# Patient Record
Sex: FEMALE | Race: WHITE | NOT HISPANIC OR LATINO | Employment: STUDENT | URBAN - METROPOLITAN AREA
[De-identification: names, ages, dates, MRNs, and addresses within clinical notes are randomized per-mention and may not be internally consistent; named-entity substitution may affect disease eponyms.]

---

## 2018-06-11 ENCOUNTER — APPOINTMENT (OUTPATIENT)
Dept: URGENT CARE | Facility: CLINIC | Age: 18
End: 2018-06-11

## 2018-06-11 PROCEDURE — 99283 EMERGENCY DEPT VISIT LOW MDM: CPT | Performed by: NURSE PRACTITIONER

## 2018-06-11 PROCEDURE — G0382 LEV 3 HOSP TYPE B ED VISIT: HCPCS | Performed by: NURSE PRACTITIONER

## 2018-06-14 ENCOUNTER — APPOINTMENT (OUTPATIENT)
Dept: URGENT CARE | Facility: CLINIC | Age: 18
End: 2018-06-14
Payer: OTHER MISCELLANEOUS

## 2018-06-14 PROCEDURE — 99213 OFFICE O/P EST LOW 20 MIN: CPT | Performed by: NURSE PRACTITIONER

## 2018-06-21 ENCOUNTER — APPOINTMENT (OUTPATIENT)
Dept: URGENT CARE | Facility: CLINIC | Age: 18
End: 2018-06-21
Payer: OTHER MISCELLANEOUS

## 2018-06-21 PROCEDURE — 99213 OFFICE O/P EST LOW 20 MIN: CPT

## 2019-07-26 ENCOUNTER — OFFICE VISIT (OUTPATIENT)
Dept: ENDOCRINOLOGY | Facility: HOSPITAL | Age: 19
End: 2019-07-26
Payer: COMMERCIAL

## 2019-07-26 VITALS
WEIGHT: 156.2 LBS | HEIGHT: 69 IN | SYSTOLIC BLOOD PRESSURE: 110 MMHG | HEART RATE: 84 BPM | DIASTOLIC BLOOD PRESSURE: 68 MMHG | BODY MASS INDEX: 23.13 KG/M2

## 2019-07-26 DIAGNOSIS — M54.2 ANTERIOR NECK PAIN: Primary | ICD-10-CM

## 2019-07-26 DIAGNOSIS — E04.9 GOITER: ICD-10-CM

## 2019-07-26 DIAGNOSIS — E04.1 THYROID NODULE: ICD-10-CM

## 2019-07-26 PROCEDURE — 99244 OFF/OP CNSLTJ NEW/EST MOD 40: CPT | Performed by: INTERNAL MEDICINE

## 2019-07-26 RX ORDER — NORGESTIMATE AND ETHINYL ESTRADIOL 7DAYSX3 28
1 KIT ORAL DAILY
Refills: 2 | COMMUNITY
Start: 2019-07-02 | End: 2021-06-03

## 2019-07-26 NOTE — PATIENT INSTRUCTIONS
We;ll try to get the thyroid blood work from the family doctors office  We'll have you sign for records from [facility]  We'll get a thyroid ultrasound  Follow up to be determined

## 2019-07-26 NOTE — PROGRESS NOTES
7/27/2019    Assessment/Plan      Diagnoses and all orders for this visit:    Anterior neck pain  -     US thyroid; Future    Thyroid nodule  -     US thyroid; Future    Goiter  -     US thyroid; Future    Other orders  -     TRI FEMYNOR 0 18/0 215/0 25 MG-35 MCG per tablet; Take 1 tablet by mouth daily        Assessment/Plan:  1  Thyroid nodule and goiter by history  I will get a repeat thyroid ultrasound now  I will try to get the previous study reports from [facility] and blood work from her PCP   Then decision can be made  If she has thyroid antibodies, thyroid hormone could be used to try to shrink the thyroid goiter and nodule to see if it helps her neck symptoms  2  Anterior neck pain  This is chronic and a sort of sensitivity type symptoms  I do not believe it is due to subacute thyroiditis as there would be much more intense pain  Follow up will be determined based on the information received  Addendum: I was able to get her blood work from her PCP which is normal with no antibodies  Last ultrasound done at [facility] in 2015 showed normal thyroid gland with small subcentimeter nodule  CC: Thyroid nodule and goiter consult    History of Present Illness     HPI: Leta Mccray is a 19y o  year old female with history of thyroid nodule and goiter  She has a lot of tenderness in left side of thyroid  She feels anything touching neck is very uncomfortable  She had MRI and CT scan in 10th grade and found abnormality in thyroid, possibly enlarged or a nodule  She feels when laying down on her back, she feels discomfort and as if her throat is closing off at times  Symptoms also occur with neck flexion  No swallowing issues  No XRT to head or neck in the past  Had an ultrasound of the thyroid done several years ago at [facility]  Of note, her mom has thyroid cancer and it was removed October 2018  She sees MICHELLE Gore  Her maternal aunt has hashimoto's  She is on no thyroid medications  She is always fatigued  She can have constipation or more frequent bowel movements especially at school  She can have trouble falling asleep at times  Resting heart rate is always over 85  She denies heat or cold intolerance, tremors, palpitations, diarrhea, anxiety, or depression  She denies hair loss, brittle nails, or dry skin  Weight is stable  Menses occur on a regular monthly basis on OCP  Prior to [facility], she had irregular menses  She has no diplopia  Review of Systems   Constitutional: Positive for fatigue  Negative for unexpected weight change  Always feels fatigued  Weight down 8-9 lbs since home from school, eating better and working out  HENT: Negative for hearing loss, tinnitus and trouble swallowing  No XRT to the head or neck in the past    Eyes: Negative for visual disturbance  No diplopia  Respiratory: Negative for chest tightness and shortness of breath  Cardiovascular: Negative for chest pain, palpitations and leg swelling  Says resting heart rate over 85 on watch  Gastrointestinal: Positive for constipation  Negative for abdominal pain, diarrhea and nausea  Can have constipation or more frequent bowel movements especially at school   Endocrine: Negative for cold intolerance and heat intolerance  Genitourinary:        Menses regular on a monthly basis on OCP  She originally went on OCP for irregular menses  Musculoskeletal: Positive for back pain  Negative for arthralgias  Chronic low back pain  Skin: Positive for rash  No dry skin  Occcasional patches of eczema  No brittle nails  No heair loss  Neurological: Negative for dizziness, tremors, light-headedness and headaches  Psychiatric/Behavioral: Positive for sleep disturbance  Negative for dysphoric mood  The patient is not nervous/anxious  Can have trouble falling asleep at times         Historical Information   Past Medical History:   Diagnosis Date    History of fractured vertebra 2014    L1 Past Surgical History:   Procedure Laterality Date    WISDOM TOOTH EXTRACTION       Social History   Social History     Substance and Sexual Activity   Alcohol Use Yes    Frequency: 2-4 times a month    Comment: at school occasionally on weekends     Social History     Substance and Sexual Activity   Drug Use Never     Social History     Tobacco Use   Smoking Status Never Smoker   Smokeless Tobacco Never Used     Family History:   Family History   Problem Relation Age of Onset    Thyroid cancer Mother     Hyperlipidemia Father     No Known Problems Brother     Hashimoto's thyroiditis Maternal Aunt     No Known Problems Brother        Meds/Allergies   Current Outpatient Medications   Medication Sig Dispense Refill    TRI Amelia Hamman 0 18/0 215/0 25 MG-35 MCG per tablet Take 1 tablet by mouth daily  2     No current facility-administered medications for this visit  Allergies   Allergen Reactions    Contrast [Iodinated Diagnostic Agents] Hives, Nausea Only and Vomiting       Objective   Vitals: Blood pressure 110/68, pulse 84, height 5' 9" (1 753 m), weight 70 9 kg (156 lb 3 2 oz)  Invasive Devices     None                 Physical Exam   Constitutional: She is oriented to person, place, and time  She appears well-developed and well-nourished  HENT:   Head: Normocephalic and atraumatic  Eyes: Pupils are equal, round, and reactive to light  Conjunctivae and EOM are normal    No lid lag, stare, proptosis, or periorbital edema  Neck: Normal range of motion  Neck supple  No thyromegaly present  Thyroid full without discrete nodule palpable  Tender and patient had strange feeling to palpation over the thyroid gland  No thyroid or carotid bruits  Cardiovascular: Normal rate, regular rhythm and normal heart sounds  No murmur heard  Pulmonary/Chest: Effort normal and breath sounds normal  She has no wheezes  Abdominal: Soft  Bowel sounds are normal  There is no tenderness     Musculoskeletal: Normal range of motion  She exhibits no edema or deformity  No tremor of the outstretched hands  No spinous process tenderness  No CVA tenderness  Lymphadenopathy:     She has no cervical adenopathy  Neurological: She is alert and oriented to person, place, and time  She has normal reflexes  Deep tendon reflexes normal without briskness  Skin: Skin is warm and dry  No rash noted  Vitals reviewed  The history was obtained from the review of the chart and from the patient  Lab Results:    Blood work done at [facility] on 5/6/19 showed a normal CMP, CBC, and lipid panel  TSH is 2 45  Thyroid peroxidase antibodies are 10 and thyroglobulin antibodies are <1 0  Thyroid ultrasound one at [facility] in 2015 showed aright thyroid lobe of 4 2 x 1 3 x 1 1 and a left lobe of the thyroid of 3 7 x 1 3 x 0 9  There was a left 2 mm hypoechoic nodule representing a colloid cyst       No results found for this or any previous visit (from the past [id number] hour(s))  No future appointments

## 2019-07-30 ENCOUNTER — TELEPHONE (OUTPATIENT)
Dept: ENDOCRINOLOGY | Facility: CLINIC | Age: 19
End: 2019-07-30

## 2019-07-31 ENCOUNTER — HOSPITAL ENCOUNTER (OUTPATIENT)
Dept: ULTRASOUND IMAGING | Facility: HOSPITAL | Age: 19
Discharge: HOME | End: 2019-07-31
Payer: COMMERCIAL

## 2019-07-31 DIAGNOSIS — E04.1 THYROID NODULE: ICD-10-CM

## 2019-07-31 DIAGNOSIS — E04.9 GOITER: ICD-10-CM

## 2019-07-31 DIAGNOSIS — M54.2 ANTERIOR NECK PAIN: ICD-10-CM

## 2019-07-31 PROCEDURE — 76536 US EXAM OF HEAD AND NECK: CPT

## 2021-06-02 RX ORDER — PHENOL 1.4 %
1 AEROSOL, SPRAY (ML) MUCOUS MEMBRANE DAILY
COMMUNITY

## 2021-06-02 RX ORDER — ECHINACEA PURPUREA EXTRACT 125 MG
1 TABLET ORAL DAILY
COMMUNITY
End: 2022-03-08

## 2021-06-02 RX ORDER — DEXTROAMPHETAMINE SACCHARATE, AMPHETAMINE ASPARTATE, DEXTROAMPHETAMINE SULFATE AND AMPHETAMINE SULFATE 3.125; 3.125; 3.125; 3.125 MG/1; MG/1; MG/1; MG/1
1 TABLET ORAL DAILY
COMMUNITY
End: 2022-03-08

## 2021-06-03 ENCOUNTER — OFFICE VISIT (OUTPATIENT)
Dept: OBGYN CLINIC | Facility: CLINIC | Age: 21
End: 2021-06-03
Payer: COMMERCIAL

## 2021-06-03 VITALS
SYSTOLIC BLOOD PRESSURE: 90 MMHG | DIASTOLIC BLOOD PRESSURE: 70 MMHG | WEIGHT: 158 LBS | HEIGHT: 69 IN | BODY MASS INDEX: 23.4 KG/M2

## 2021-06-03 DIAGNOSIS — Z11.3 SCREENING EXAMINATION FOR STD (SEXUALLY TRANSMITTED DISEASE): Primary | ICD-10-CM

## 2021-06-03 PROCEDURE — 87591 N.GONORRHOEAE DNA AMP PROB: CPT | Performed by: OBSTETRICS & GYNECOLOGY

## 2021-06-03 PROCEDURE — 99213 OFFICE O/P EST LOW 20 MIN: CPT | Performed by: OBSTETRICS & GYNECOLOGY

## 2021-06-03 PROCEDURE — 87491 CHLMYD TRACH DNA AMP PROBE: CPT | Performed by: OBSTETRICS & GYNECOLOGY

## 2021-06-03 RX ORDER — NORETHINDRONE ACETATE AND ETHINYL ESTRADIOL 1MG-20(24)
1 KIT ORAL DAILY
COMMUNITY
End: 2022-01-07 | Stop reason: SDUPTHER

## 2021-06-03 NOTE — PATIENT INSTRUCTIONS
Encouraged  Condom use     If you have further  Questioning  Access the  [facility] website

## 2021-06-03 NOTE — PROGRESS NOTES
PROBLEM GYNECOLOGICAL VISIT    Joe Carmen is a 23 y o  female who presents today with complaint of desire for  sti screening  Her general medical history has been reviewed and she reports it as follows:    Past Medical History:   Diagnosis Date    Chlamydia 2016    History of fractured vertebra 2014    L1    Papanicolaou smear 11/25/2020    neg    Thyroid goiter      Past Surgical History:   Procedure Laterality Date    WISDOM TOOTH EXTRACTION       OB History    No obstetric history on file  Social History     Tobacco Use    Smoking status: Never Smoker    Smokeless tobacco: Never Used   Substance Use Topics    Alcohol use: Yes     Frequency: 2-4 times a month     Comment: at school occasionally on weekends    Drug use: Never       Current Outpatient Medications   Medication Instructions    amphetamine-dextroamphetamine (ADDERALL) 12 5 MG tablet 1 tablet, Oral, Daily    Echinacea 125 MG TABS 1 tablet, Oral, Daily    Multiple Vitamins-Minerals (Multivitamin Women) TABS 1 tablet, Oral, Daily    norethindrone-ethinyl estradiol-ferrous fumarate (Blisovi 24 Fe) 1-20 MG-MCG(24) per tablet 1 tablet, Oral, Daily    TRI FEMYNOR 0 18/0 215/0 25 MG-35 MCG per tablet 1 tablet, Oral, Daily       History of Present Illness:   Pt desires  STD screening      Review of Systems:  Review of Systems   Constitutional: Negative  Gastrointestinal: Negative  Endocrine: Negative  Genitourinary: Negative  Neurological: Negative  Psychiatric/Behavioral: Negative  All other systems reviewed and are negative  Physical Exam:  There were no vitals taken for this visit  Physical Exam  Constitutional:       Appearance: Normal appearance  Genitourinary:      Vulva and rectum normal    Neck:      Musculoskeletal: Normal range of motion  Neurological:      General: No focal deficit present  Mental Status: She is alert     Psychiatric:         Mood and Affect: Mood normal          Behavior: Behavior normal    Vitals signs and nursing note reviewed  Assessment:   1  Screening for  Std      Plan:  Encouraged  Condom use   Dwpt  That  They are screening tests and  We may need to do  Further counseling and testing  Reviewed with patient that [facility], but that they will not necessarily be reviewed by me immediately  Explained that I will review results at my earliest opportunity and contact patient appropriately

## 2021-06-04 LAB
C TRACH DNA SPEC QL NAA+PROBE: NEGATIVE
HAV IGM SERPL QL IA: NEGATIVE
HBV CORE IGM SERPL QL IA: NEGATIVE
HBV SURFACE AG SERPL QL IA: NEGATIVE
HCV AB S/CO SERPL IA: <0.1 S/CO RATIO (ref 0–0.9)
HIV 1+2 AB+HIV1 P24 AG SERPL QL IA: NON REACTIVE
N GONORRHOEA DNA SPEC QL NAA+PROBE: NEGATIVE
RPR SER QL: NON REACTIVE

## 2021-09-23 ENCOUNTER — VBI (OUTPATIENT)
Dept: ADMINISTRATIVE | Facility: OTHER | Age: 21
End: 2021-09-23

## 2021-09-23 NOTE — TELEPHONE ENCOUNTER
Upon review of the In Basket request we were able to locate, review, and update the patient chart as requested for Pap Smear (HPV) aka Cervical Cancer Screening  Any additional questions or concerns should be emailed to the Practice Liaisons via [email or web address] email, please do not reply via In Basket      Thank you  Liliana Wetzel

## 2022-01-07 ENCOUNTER — TELEPHONE (OUTPATIENT)
Dept: OBGYN CLINIC | Facility: CLINIC | Age: 22
End: 2022-01-07

## 2022-01-07 DIAGNOSIS — Z30.41 SURVEILLANCE FOR BIRTH CONTROL, ORAL CONTRACEPTIVES: Primary | ICD-10-CM

## 2022-01-07 RX ORDER — NORETHINDRONE ACETATE AND ETHINYL ESTRADIOL 1MG-20(24)
1 KIT ORAL DAILY
Qty: 90 TABLET | Refills: 0 | Status: SHIPPED | OUTPATIENT
Start: 2022-01-07 | End: 2022-01-10

## 2022-01-07 NOTE — TELEPHONE ENCOUNTER
Mabel left a message requesting Blisov 24 Fe renewal  She did schedule WA for 03/08/22  She also has an appt on Monday for a problem  Sent message to BinWiselamar Ward

## 2022-01-10 ENCOUNTER — OFFICE VISIT (OUTPATIENT)
Dept: OBGYN CLINIC | Facility: CLINIC | Age: 22
End: 2022-01-10
Payer: COMMERCIAL

## 2022-01-10 VITALS
WEIGHT: 162 LBS | SYSTOLIC BLOOD PRESSURE: 104 MMHG | DIASTOLIC BLOOD PRESSURE: 74 MMHG | BODY MASS INDEX: 23.99 KG/M2 | HEIGHT: 69 IN

## 2022-01-10 DIAGNOSIS — Z30.41 ORAL CONTRACEPTIVE PILL SURVEILLANCE: ICD-10-CM

## 2022-01-10 DIAGNOSIS — N76.1 SUBACUTE VAGINITIS: ICD-10-CM

## 2022-01-10 DIAGNOSIS — Z11.3 SCREEN FOR STD (SEXUALLY TRANSMITTED DISEASE): Primary | ICD-10-CM

## 2022-01-10 DIAGNOSIS — N92.6 IRREGULAR MENSES: ICD-10-CM

## 2022-01-10 LAB
BV WHIFF TEST VAG QL: ABNORMAL
CLUE CELLS SPEC QL WET PREP: ABNORMAL
PH SMN: 4 [PH]
SL AMB POCT WET MOUNT: ABNORMAL
T VAGINALIS VAG QL WET PREP: ABNORMAL
YEAST VAG QL WET PREP: ABNORMAL

## 2022-01-10 PROCEDURE — 87210 SMEAR WET MOUNT SALINE/INK: CPT | Performed by: OBSTETRICS & GYNECOLOGY

## 2022-01-10 PROCEDURE — 99213 OFFICE O/P EST LOW 20 MIN: CPT | Performed by: OBSTETRICS & GYNECOLOGY

## 2022-01-10 RX ORDER — NORETHINDRONE ACETATE AND ETHINYL ESTRADIOL 1MG-20(24)
1 KIT ORAL DAILY
Qty: 90 TABLET | Refills: 0 | Status: SHIPPED | OUTPATIENT
Start: 2022-01-10 | End: 2022-03-08 | Stop reason: SDUPTHER

## 2022-01-10 RX ORDER — DEXTROAMPHETAMINE SACCHARATE, AMPHETAMINE ASPARTATE, DEXTROAMPHETAMINE SULFATE AND AMPHETAMINE SULFATE 3.75; 3.75; 3.75; 3.75 MG/1; MG/1; MG/1; MG/1
15 TABLET ORAL DAILY
COMMUNITY

## 2022-01-10 NOTE — PROGRESS NOTES
PROBLEM GYNECOLOGICAL VISIT    Johnny Camarena is a 19 y o  female who presents today with complaint of vaginal discharge  Her general medical history has been reviewed and she reports it as follows:    Past Medical History:   Diagnosis Date    Chlamydia 2016    History of fractured vertebra     L1    Papanicolaou smear 2020    neg    Thyroid goiter      Past Surgical History:   Procedure Laterality Date    WISDOM TOOTH EXTRACTION       OB History        0    Para   0    Term   0       0    AB   0    Living   0       SAB   0    IAB   0    Ectopic   0    Multiple   0    Live Births   0               Social History     Tobacco Use    Smoking status: Never Smoker    Smokeless tobacco: Never Used   Vaping Use    Vaping Use: Never used   Substance Use Topics    Alcohol use: Yes     Comment: at school occasionally on weekends    Drug use: Never       Current Outpatient Medications   Medication Instructions    amphetamine-dextroamphetamine (ADDERALL) 12 5 MG tablet 1 tablet, Oral, Daily    amphetamine-dextroamphetamine (ADDERALL) 15 MG tablet 15 mg, Oral, Daily    Echinacea 125 MG TABS 1 tablet, Oral, Daily    Multiple Vitamins-Minerals (Multivitamin Women) TABS 1 tablet, Oral, Daily    norethindrone-ethinyl estradiol-ferrous fumarate (Blisovi 24 Fe) 1-20 MG-MCG(24) per tablet 1 tablet, Oral, Daily       History of Present Illness:   +  Vaginal discharge and  Odor  Menses have  Been lighter   w only one day bleeding     + missed pills   Can be late taking them    Pregancy test  On       +  Ex boyfriend some condom use  Review of Systems:  Review of Systems   Constitutional: Negative  Eyes: Negative  Respiratory: Negative  Cardiovascular: Negative  Gastrointestinal: Negative  Endocrine: Negative  Genitourinary: Positive for decreased urine volume, menstrual problem and vaginal discharge   Negative for flank pain, frequency, genital sores, hematuria and vaginal bleeding  Neurological: Negative  Hematological: Negative  Psychiatric/Behavioral: Negative  Physical Exam:  /74 (BP Location: Left arm, Patient Position: Sitting, Cuff Size: Standard)   Ht 5' 9" (1 753 m)   Wt 73 5 kg (162 lb)   LMP 12/11/2021 Comment: for one day only   BMI 23 92 kg/m²   Physical Exam  Constitutional:       Appearance: Normal appearance  HENT:      Head: Normocephalic  Abdominal:      General: Abdomen is flat  Palpations: Abdomen is soft  Musculoskeletal:         General: Normal range of motion  Neurological:      General: No focal deficit present  Mental Status: She is alert and oriented to person, place, and time  Skin:     General: Skin is warm and dry  Psychiatric:         Mood and Affect: Mood normal          Behavior: Behavior normal          Thought Content: Thought content normal          Judgment: Judgment normal    Vitals and nursing note reviewed  Point of Care Testing:   -Wet mount: +    -KOH mount: neg   -Whiff: neg       Assessment:   1  Candida vaginitis ,  Vulvitis    Screen for std  Irregular menses  Keep a menstrual calender      Plan:   *GC chlam  Ordered  And  Sent , open  To air ,   Terazol 7 one applicator at bedtime for  7 nights  Decrease sugar intake  Condoms  Reviewed with patient that test results are available in MyChart immediately, but that they will not necessarily be reviewed by me immediately    Explained that I will review results at my earliest opportunity and contact patient appropriately

## 2022-01-10 NOTE — PATIENT INSTRUCTIONS
- Through a patient centered approach to contraceptive counseling, we discussed a variety of contraceptive methods including pill, patch, ring, injectable, long-acting reversible methods such as the subdermal contraceptive implant and intrauterine device, and sterilization  We compared the efficacy of various methods using a tiered efficacy chart  We discussed the expected changes on menstrual bleeding and other side effects of various methods  - We assessed for medical conditions that could affect the safety profile of contraception  She does not smoke, denies a history of hypertension or VTE, and denies a history of migraine with aura  - The patient opts to proceed with continuing the pill  If she  Desires  An  IUD she will contact the office  Make sure you use the  Terazol 7 cream for at least  5 nights per vagina

## 2022-01-13 LAB
C TRACH RRNA SPEC QL NAA+PROBE: NEGATIVE
N GONORRHOEA RRNA SPEC QL NAA+PROBE: NEGATIVE

## 2022-03-08 ENCOUNTER — ANNUAL EXAM (OUTPATIENT)
Dept: OBGYN CLINIC | Facility: CLINIC | Age: 22
End: 2022-03-08
Payer: COMMERCIAL

## 2022-03-08 VITALS
DIASTOLIC BLOOD PRESSURE: 72 MMHG | SYSTOLIC BLOOD PRESSURE: 110 MMHG | WEIGHT: 164.4 LBS | BODY MASS INDEX: 24.35 KG/M2 | HEIGHT: 69 IN

## 2022-03-08 DIAGNOSIS — Z11.3 SCREEN FOR STD (SEXUALLY TRANSMITTED DISEASE): ICD-10-CM

## 2022-03-08 DIAGNOSIS — N76.1 SUBACUTE VAGINITIS: ICD-10-CM

## 2022-03-08 DIAGNOSIS — Z30.41 ORAL CONTRACEPTIVE PILL SURVEILLANCE: ICD-10-CM

## 2022-03-08 DIAGNOSIS — Z01.419 ENCOUNTER FOR GYNECOLOGICAL EXAMINATION WITHOUT ABNORMAL FINDING: Primary | ICD-10-CM

## 2022-03-08 LAB
BV WHIFF TEST VAG QL: NORMAL
CLUE CELLS SPEC QL WET PREP: NORMAL
PH SMN: 4 [PH]
SL AMB POCT WET MOUNT: NORMAL
T VAGINALIS VAG QL WET PREP: NORMAL
YEAST VAG QL WET PREP: NORMAL

## 2022-03-08 PROCEDURE — S0612 ANNUAL GYNECOLOGICAL EXAMINA: HCPCS | Performed by: OBSTETRICS & GYNECOLOGY

## 2022-03-08 PROCEDURE — 87210 SMEAR WET MOUNT SALINE/INK: CPT | Performed by: OBSTETRICS & GYNECOLOGY

## 2022-03-08 RX ORDER — CETIRIZINE HYDROCHLORIDE 10 MG/1
10 TABLET ORAL DAILY
COMMUNITY
Start: 2022-01-25

## 2022-03-08 RX ORDER — NORETHINDRONE ACETATE AND ETHINYL ESTRADIOL 1MG-20(24)
1 KIT ORAL DAILY
Qty: 90 TABLET | Refills: 4 | Status: SHIPPED | OUTPATIENT
Start: 2022-03-08 | End: 2023-03-08

## 2022-03-08 RX ORDER — DOXYCYCLINE HYCLATE 100 MG
TABLET ORAL
COMMUNITY
Start: 2022-03-03 | End: 2022-03-08

## 2022-03-08 NOTE — PROGRESS NOTES
[address], [address], [facility], [address] [facility]    ASSESSMENT/PLAN: Arron Brush is a 19 y o  Amada Mealing who presents for annual gynecologic exam     Encounter for routine gynecologic examination  - Routine well woman exam completed today  - HPV Vaccination status: Immunization series complete   -COVID vccine  +  Pfizer and booster   ,   Flu shot   +   - STI screening offered including HIV testing: today cultures    - Contraceptive counseling discussed  Current contraception: condoms or combination OCPs:     Additional problems addressed during this visit:  1  Encounter for gynecological examination without abnormal finding    2  Screen for STD (sexually transmitted disease)  Comments:  condoms     3  Oral contraceptive pill surveillance  Comments:  pt w  light spotting one day ,  Denies  ACHEs adn BTB   if no spottingin 3 mo contact office COLTON babcock   Orders:  -     norethindrone-ethinyl estradiol-ferrous fumarate (Blisovi 24 Fe) 1-20 MG-MCG(24) per tablet; Take 1 tablet by mouth daily    4  Oral contraceptive pill surveillance  Comments:  + missed pills and  menses have been very light   Orders:  -     norethindrone-ethinyl estradiol-ferrous fumarate (Blisovi 24 Fe) 1-20 MG-MCG(24) per tablet; Take 1 tablet by mouth daily    5  Subacute vaginitis  Comments:  wet mount neg, may use refill open to air   Orders:  -     POCT wet mount        CC:  Annual Gynecologic Examination    HPI: Arron Brush is a 19 y o  Amada Mealing who presents for annual gynecologic examination  17-year-old  0 para 0 here for wellness exam   Past medical history acne thyroid goiter past surgical history wisdom teeth  Family history maternal grandmother diagnosed with breast cancer no family history of uterine ovarian or colon cancer  Social history nonsmoker occasional alcohol on weekends maybe 1-4 per week    Gyn history on OCP cycles every 28-30 days regular mild to moderate cramps previously sexually active encouraged condom use treated for chlamydia in 2016  Positive history of female ejaculation w  [place]  The following portions of the patient's history were reviewed and updated as appropriate: She  has a past medical history of Chlamydia (2016), History of fractured vertebra (2014), Melanoma ([facility] [address] ), Papanicolaou smear (11/25/2020), and Thyroid goiter  She  has a past surgical history that includes [place] tooth extraction and Skin cancer excision (Right)  Her family history includes Breast cancer in her maternal grandmother; Hashimoto's thyroiditis in her maternal aunt; Hyperlipidemia in her father; No Known Problems in her brother and brother; Thyroid cancer in her mother  She  reports that she has never smoked  She has never used smokeless tobacco  She reports current alcohol use  She reports that she does not use drugs  Current Outpatient Medications   Medication Sig Dispense Refill    amphetamine-dextroamphetamine (ADDERALL) 15 MG tablet Take 15 mg by mouth daily      cetirizine (ZyrTEC) 10 mg tablet Take 10 mg by mouth daily      Multiple Vitamins-Minerals (Multivitamin Women) TABS Take 1 tablet by mouth daily      norethindrone-ethinyl estradiol-ferrous fumarate (Blisovi 24 Fe) 1-20 MG-MCG(24) per tablet Take 1 tablet by mouth daily 90 tablet 4     No current facility-administered medications for this visit  She is allergic to contrast [iodinated diagnostic agents]       Review of Systems   Constitutional: Negative for chills and fever  HENT: Negative for ear pain and sore throat  Eyes: Negative for pain and visual disturbance  Respiratory: Negative for cough and shortness of breath  Cardiovascular: Negative for chest pain and palpitations  Gastrointestinal: Negative for abdominal pain and vomiting  Genitourinary: Negative for dysuria and hematuria  Musculoskeletal: Negative for arthralgias and back pain  Skin: Negative for color change and rash  Neurological: Negative for seizures and syncope  Psychiatric/Behavioral: Negative  All other systems reviewed and are negative  Objective:  /72 (BP Location: Left arm, Patient Position: Sitting, Cuff Size: Standard)   Ht 5' 8 5" (1 74 m)   Wt 74 6 kg (164 lb 6 4 oz)   LMP 12/11/2021 (Exact Date)   BMI 24 63 kg/m²    Physical Exam  Vitals and nursing note reviewed  Constitutional:       Appearance: Normal appearance  HENT:      Head: Normocephalic  Cardiovascular:      Rate and Rhythm: Normal rate and regular rhythm  Pulses: Normal pulses  Heart sounds: Normal heart sounds  Pulmonary:      Effort: Pulmonary effort is normal       Breath sounds: Normal breath sounds  Chest:      Chest wall: No mass, lacerations, swelling, tenderness or edema  Breasts: Jaret Score is 4  Breasts are symmetrical       Right: Normal  No swelling, bleeding, inverted nipple, mass, nipple discharge, skin change, tenderness, axillary adenopathy or supraclavicular adenopathy  Left: No swelling, bleeding, inverted nipple, mass, nipple discharge, skin change, tenderness, axillary adenopathy or supraclavicular adenopathy  Abdominal:      General: Abdomen is flat  Bowel sounds are normal       Palpations: Abdomen is soft  Genitourinary:     General: Normal vulva  Exam position: Lithotomy position  Pubic Area: No rash  Jaret stage (genital): 4       Labia:         Right: No rash, tenderness or lesion  Left: No rash, tenderness or lesion  Urethra: No urethral pain, urethral swelling or urethral lesion  Vagina: Normal       Cervix: No cervical motion tenderness or discharge  Uterus: Normal        Adnexa: Right adnexa normal and left adnexa normal       Rectum: Normal    Musculoskeletal:         General: Normal range of motion  Cervical back: Neck supple     Lymphadenopathy:      Upper Body:      Right upper body: No supraclavicular, axillary or pectoral adenopathy  Left upper body: No supraclavicular, axillary or pectoral adenopathy  Lower Body: No right inguinal adenopathy  No left inguinal adenopathy  Skin:     General: Skin is warm and dry  Neurological:      General: No focal deficit present  Mental Status: She is alert and oriented to person, place, and time  Psychiatric:         Mood and Affect: Mood normal          Behavior: Behavior normal          Thought Content:  Thought content normal          Judgment: Judgment normal        wet mount  Ph  4 neg  Whiff  no clue or  Hyphae

## 2022-03-08 NOTE — PATIENT INSTRUCTIONS
Pap every 3 years if normal, STI testing as indicated, exercise most days of week, obtain appropriate diet and hydration, Calcium 1000mg + 600 vit D daily, birth control as directed (ACHES reviewed)  Benefits, risks and alternatives discussed/reviewed  Condom use when sexually active for sexually transmitted infection prevention  HPV 9 vaccine recommended through age 39  Check with your insurance for coverage  If covered, call office to schedule start of vaccine series  Annual mammogram starting at age 36, monthly breast self exam  [place] Cancer   at red light or at least  20 times a day

## 2022-03-10 LAB
C TRACH RRNA SPEC QL NAA+PROBE: NEGATIVE
N GONORRHOEA RRNA SPEC QL NAA+PROBE: NEGATIVE

## 2022-07-05 ENCOUNTER — TELEPHONE (OUTPATIENT)
Dept: OBGYN CLINIC | Facility: CLINIC | Age: 22
End: 2022-07-05
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
## 2022-07-05 NOTE — TELEPHONE ENCOUNTER
Mabel l/m she has a question for Alan Liu 77  She did not want to send her question through the patient portal  L/M on patient v/m returning her call  C/B for further discussion

## 2022-10-12 PROBLEM — Z11.3 SCREEN FOR STD (SEXUALLY TRANSMITTED DISEASE): Status: RESOLVED | Noted: 2022-01-10 | Resolved: 2022-10-12

## 2022-10-21 ENCOUNTER — OFFICE VISIT (OUTPATIENT)
Dept: OBGYN CLINIC | Facility: CLINIC | Age: 22
End: 2022-10-21

## 2022-10-21 VITALS
WEIGHT: 165.4 LBS | HEIGHT: 69 IN | DIASTOLIC BLOOD PRESSURE: 75 MMHG | SYSTOLIC BLOOD PRESSURE: 130 MMHG | BODY MASS INDEX: 24.5 KG/M2

## 2022-10-21 DIAGNOSIS — B37.9 YEAST INFECTION: ICD-10-CM

## 2022-10-21 DIAGNOSIS — B96.89 BACTERIAL VAGINOSIS: ICD-10-CM

## 2022-10-21 DIAGNOSIS — N76.0 BACTERIAL VAGINOSIS: ICD-10-CM

## 2022-10-21 DIAGNOSIS — Z11.3 SCREENING EXAMINATION FOR VENEREAL DISEASE: Primary | ICD-10-CM

## 2022-10-21 PROBLEM — J01.90 ACUTE SINUSITIS: Status: ACTIVE | Noted: 2021-02-17

## 2022-10-21 PROBLEM — Z20.828 SARS-ASSOCIATED CORONAVIRUS EXPOSURE: Status: ACTIVE | Noted: 2021-02-17

## 2022-10-21 LAB
BV WHIFF TEST VAG QL: ABNORMAL
CLUE CELLS SPEC QL WET PREP: ABNORMAL
PH SMN: 3.5 [PH]
SL AMB POCT WET MOUNT: ABNORMAL
T VAGINALIS VAG QL WET PREP: ABNORMAL
YEAST VAG QL WET PREP: ABNORMAL

## 2022-10-21 RX ORDER — FLUCONAZOLE 150 MG/1
150 TABLET ORAL EVERY OTHER DAY
Qty: 2 TABLET | Refills: 0 | Status: SHIPPED | OUTPATIENT
Start: 2022-10-21 | End: 2022-10-24

## 2022-10-21 RX ORDER — METRONIDAZOLE 7.5 MG/G
1 GEL VAGINAL DAILY
Qty: 70 G | Refills: 0 | Status: SHIPPED | OUTPATIENT
Start: 2022-10-21 | End: 2022-10-26

## 2022-10-21 NOTE — PROGRESS NOTES
Assessment/Plan:    Bacterial vaginosis  Reviewed BV and yeast on wet mount today  Will plan to treat with Diflucan 150mg x 2 doses and Metrogel vaginal x 5 nights  For prevention: Recommend acidophilus or yogurt daily, avoid irritating soaps or lotions, no tight fitted pants or underwear, avoid bubble baths, do not stay in wet swimsuit  STD cultures done today  Call office if symptoms are not improving or worsening  Reassured patient normal to not get much of a menses on Blisovi 24 Fe  Problem List Items Addressed This Visit        Genitourinary    Bacterial vaginosis     Reviewed BV and yeast on wet mount today  Will plan to treat with Diflucan 150mg x 2 doses and Metrogel vaginal x 5 nights  For prevention: Recommend acidophilus or yogurt daily, avoid irritating soaps or lotions, no tight fitted pants or underwear, avoid bubble baths, do not stay in wet swimsuit  STD cultures done today  Call office if symptoms are not improving or worsening  Reassured patient normal to not get much of a menses on Blisovi 24 Fe  Relevant Medications    metroNIDAZOLE (METROGEL) 0 75 % vaginal gel    Other Relevant Orders    POCT wet mount (Completed)      Other Visit Diagnoses     Screening examination for venereal disease    -  Primary    Relevant Orders    Chlamydia/GC amplified DNA by PCR    Yeast infection        Relevant Medications    fluconazole (DIFLUCAN) 150 mg tablet    Other Relevant Orders    POCT wet mount (Completed)            Subjective:      Patient ID: Magy Rebolledo is a 23 y o  female  HPI  22 yo seen for persistent vaginal discharge and irritation  Tried Monistat about 2 months ago with no relief  Denies odor, pelvic pain, fever, chills  Would like STD testing today  Currently on Blisovi 24  Reports gets menses once every 3 months and when she does get it, just brown discharge     The following portions of the patient's history were reviewed and updated as appropriate: She  has a past medical history of Chlamydia (2016), History of fractured vertebra (2014), Melanoma ([facility] [address] ), Papanicolaou smear (11/25/2020), and Thyroid goiter  She   Patient Active Problem List    Diagnosis Date Noted   • Bacterial vaginosis 10/21/2022   • Oral contraceptive pill surveillance 03/08/2022   • Subacute vaginitis 01/10/2022   • Irregular menses 01/10/2022   • Acute sinusitis 02/17/2021   • SARS-associated coronavirus exposure 02/17/2021   • Anterior neck pain 07/26/2019   • Goiter 07/26/2019   • Thyroid nodule 01/08/2014   • L1 vertebral fracture ([facility] [address] ) 01/02/2014     She  has a past surgical history that includes [place] tooth extraction and Skin cancer excision (Right)  Her family history includes Breast cancer in her maternal grandmother; Hashimoto's thyroiditis in her maternal aunt; Hyperlipidemia in her father; No Known Problems in her brother and brother; Thyroid cancer in her mother  She  reports that she has never smoked  She has never used smokeless tobacco  She reports current alcohol use  She reports that she does not use drugs  Current Outpatient Medications   Medication Sig Dispense Refill   • amphetamine-dextroamphetamine (ADDERALL) 15 MG tablet Take 15 mg by mouth daily     • fluconazole (DIFLUCAN) 150 mg tablet Take 1 tablet (150 mg total) by mouth every other day for 2 doses 2 tablet 0   • metroNIDAZOLE (METROGEL) 0 75 % vaginal gel Insert 1 application into the vagina in the morning for 5 days 70 g 0   • Multiple Vitamins-Minerals (Multivitamin Women) TABS Take 1 tablet by mouth daily     • norethindrone-ethinyl estradiol-ferrous fumarate (Blisovi 24 Fe) 1-20 MG-MCG(24) per tablet Take 1 tablet by mouth daily 90 tablet 4     No current facility-administered medications for this visit  She is allergic to iodinated diagnostic agents       Review of Systems   Constitutional: Negative for fatigue, fever and unexpected weight change     HENT: Negative for dental problem and sinus pressure  Eyes: Negative for visual disturbance  Respiratory: Negative for cough, shortness of breath and wheezing  Cardiovascular: Negative for chest pain  Gastrointestinal: Negative for abdominal pain, blood in stool, constipation, diarrhea, nausea and vomiting  Endocrine: Negative for polydipsia  Genitourinary: Positive for vaginal discharge  Negative for difficulty urinating, dyspareunia, dysuria, frequency, hematuria, pelvic pain and urgency  Musculoskeletal: Negative for arthralgias and back pain  Neurological: Negative for dizziness, seizures, light-headedness and headaches  Psychiatric/Behavioral: Negative for suicidal ideas  The patient is not nervous/anxious  Objective:      /75 (BP Location: Left arm, Patient Position: Sitting, Cuff Size: Standard)   Ht 5' 8 5" (1 74 m)   Wt 75 kg (165 lb 6 4 oz)   BMI 24 78 kg/m²          Physical Exam  Constitutional:       Appearance: Normal appearance  She is well-developed  Neck:      Thyroid: No thyroid mass or thyromegaly  Cardiovascular:      Rate and Rhythm: Normal rate and regular rhythm  Heart sounds: Normal heart sounds  No murmur heard  No friction rub  No gallop  Pulmonary:      Effort: Pulmonary effort is normal  No respiratory distress  Breath sounds: Normal breath sounds  No wheezing or rales  Chest:   Breasts:      Right: No supraclavicular adenopathy  Left: No supraclavicular adenopathy  Abdominal:      General: Bowel sounds are normal  There is no distension  Palpations: Abdomen is soft  There is no mass  Tenderness: There is no abdominal tenderness  There is no guarding or rebound  Genitourinary:     Labia:         Right: No rash, tenderness, lesion or injury  Left: No rash, tenderness, lesion or injury  Urethra: No prolapse, urethral pain, urethral swelling or urethral lesion  Vagina: No signs of injury   Vaginal discharge (white discharge) present  No erythema, tenderness or bleeding  Cervix: No cervical motion tenderness, discharge or friability  Uterus: Not deviated, not enlarged and not tender  Adnexa:         Right: No mass, tenderness or fullness  Left: No mass, tenderness or fullness  Musculoskeletal:      Cervical back: Neck supple  Lymphadenopathy:      Cervical: No cervical adenopathy  Upper Body:      Right upper body: No supraclavicular adenopathy  Left upper body: No supraclavicular adenopathy  Skin:     General: Skin is warm and dry  Coloration: Skin is not pale  Findings: No erythema or rash  Neurological:      Mental Status: She is alert and oriented to person, place, and time  Psychiatric:         Behavior: Behavior normal          Thought Content: Thought content normal          Judgment: Judgment normal        wet mount: clue cells and budding yeast throughout  No trichomonads   PH: 3 5

## 2022-10-21 NOTE — ASSESSMENT & PLAN NOTE
Reviewed BV and yeast on wet mount today  Will plan to treat with Diflucan 150mg x 2 doses and Metrogel vaginal x 5 nights  For prevention: Recommend acidophilus or yogurt daily, avoid irritating soaps or lotions, no tight fitted pants or underwear, avoid bubble baths, do not stay in wet swimsuit  STD cultures done today  Call office if symptoms are not improving or worsening  Reassured patient normal to not get much of a menses on Blisovi 24 Fe

## 2022-10-23 LAB
C TRACH RRNA SPEC QL NAA+PROBE: NEGATIVE
N GONORRHOEA RRNA SPEC QL NAA+PROBE: NEGATIVE

## 2022-12-01 DIAGNOSIS — B96.89 BACTERIAL VAGINOSIS: Primary | ICD-10-CM

## 2022-12-01 DIAGNOSIS — N76.0 BACTERIAL VAGINOSIS: Primary | ICD-10-CM

## 2022-12-01 RX ORDER — METRONIDAZOLE 500 MG/1
500 TABLET ORAL EVERY 12 HOURS SCHEDULED
Qty: 14 TABLET | Refills: 0 | Status: SHIPPED | OUTPATIENT
Start: 2022-12-01 | End: 2022-12-08

## 2022-12-20 DIAGNOSIS — Z30.41 ORAL CONTRACEPTIVE PILL SURVEILLANCE: ICD-10-CM

## 2022-12-20 PROBLEM — J01.90 ACUTE SINUSITIS: Status: RESOLVED | Noted: 2021-02-17 | Resolved: 2022-12-20

## 2022-12-20 RX ORDER — NORETHINDRONE ACETATE AND ETHINYL ESTRADIOL 1MG-20(24)
1 KIT ORAL DAILY
Qty: 90 TABLET | Refills: 0 | Status: SHIPPED | OUTPATIENT
Start: 2022-12-20 | End: 2023-03-21

## 2023-03-21 DIAGNOSIS — Z30.41 ORAL CONTRACEPTIVE PILL SURVEILLANCE: ICD-10-CM

## 2023-03-21 LAB
HBA1C MFR BLD HPLC: 5 %
HCV AB SER-ACNC: NORMAL

## 2023-03-21 RX ORDER — NORETHINDRONE ACETATE AND ETHINYL ESTRADIOL 1MG-20(24)
KIT ORAL
Qty: 84 TABLET | Refills: 0 | Status: SHIPPED | OUTPATIENT
Start: 2023-03-21 | End: 2023-03-27 | Stop reason: SDUPTHER

## 2023-03-26 NOTE — PROGRESS NOTES
[address], [address], [facility], [address] [facility]    ASSESSMENT/PLAN: Jak Baptiste is a 23 y o  Iván Hughes who presents for annual gynecologic exam     Encounter for routine gynecologic examination  - Routine well woman exam completed today  - HPV Vaccination status: Immunization series complete   -COVID vccine  +  Pfizer and booster   ,   Flu shot   +   - STI screening offered including HIV testing: today   - Contraceptive counseling discussed  Current contraception: condoms or combination OCPs:     Additional problems addressed during this visit:  1  Encounter for gynecological examination without abnormal finding    2  Screen for STD (sexually transmitted disease)    3  Oral contraceptive pill surveillance  Comments:  + missed pills and  menses have been very light     24 yo not currently sexually active here for  Wellness exam   Cycles very light on   24 d  Pill maybe every few months    + had  TSH done by primary and  4  Feels  High end  of normal  Primary  Wishes to repeat in  6 mo   No  Referral to endocrine  + steady wt, fatigue and aches and pains  Encouraged repeat in 6 mo  Uses  Boric acid supp if feels ph is off  Not daily or regular use and it helps  Pap done today       CC:  Annual Gynecologic Examination    HPI: Jak Baptiste is a 23 y o  Iván Hughes who presents for annual gynecologic examination  56-year-old  0 para 0 here for wellness exam   Past medical history acne thyroid goiter past surgical history wisdom teeth  Family history maternal grandmother diagnosed with breast cancer no family history of uterine ovarian or colon cancer  Social history nonsmoker occasional alcohol on weekends maybe 1-4 per week  Gyn history on OCP cycles  Are very light  Brown in color,   mild to moderate cramps previously sexually active encouraged condom use treated for chlamydia in 2016  Positive history of female ejaculation w  Climaxing         The following portions of the patient's history were reviewed and updated as appropriate: She  has a [facility], [facility]uary 2022), Chlamydia (2016), History of fractured vertebra (2014), Melanoma ([facility] [address] ), Papanicolaou smear (11/25/2020), and Thyroid goiter  She  has a past surgical history that includes [place] tooth extraction and Skin cancer excision (Right)  Her family history includes Breast cancer in her maternal grandmother; Cancer in her mother; Hashimoto's thyroiditis in her maternal aunt; Hyperlipidemia in her father; No Known Problems in her brother and brother; Thyroid cancer in her mother  She  reports that she has never smoked  She has never been exposed to tobacco smoke  She has never used smokeless tobacco  She reports current alcohol use of about 6 0 standard drinks per week  She reports that she does not use drugs  Current Outpatient Medications   Medication Sig Dispense Refill   • Blisovi 24 Fe 1-20 MG-MCG(24) per tablet TAKE 1 TABLET BY MOUTH EVERY DAY 84 tablet 0   • cetirizine (ZyrTEC) 10 mg tablet Take 10 mg by mouth daily     • Multiple Vitamins-Minerals (Multivitamin Women) TABS Take 1 tablet by mouth daily     • Vyvanse 30 MG capsule      • amphetamine-dextroamphetamine (ADDERALL) 15 MG tablet Take 15 mg by mouth daily       No current facility-administered medications for this visit  She is allergic to iodinated contrast media       Review of Systems   Constitutional: Negative for chills and fever  HENT: Negative for ear pain and sore throat  Eyes: Negative for pain and visual disturbance  Respiratory: Negative for cough and shortness of breath  Cardiovascular: Negative for chest pain and palpitations  Gastrointestinal: Negative for abdominal pain and vomiting  Genitourinary: Negative for dysuria and hematuria  Musculoskeletal: Negative for arthralgias and back pain  Skin: Negative for color change and rash     Neurological: Negative for seizures "and syncope  Hematological: Negative  Psychiatric/Behavioral: Negative  All other systems reviewed and are negative  Objective:  /74 (BP Location: Left arm, Patient Position: Sitting, Cuff Size: Standard)   Ht 5' 8 5\" (1 74 m)   Wt 74 8 kg (165 lb)   LMP 12/12/2022 Comment: Pt gets cycle every three months  BMI 24 72 kg/m²    Physical Exam  Vitals and nursing note reviewed  Constitutional:       Appearance: Normal appearance  HENT:      Head: Normocephalic  Neck:      Thyroid: Thyromegaly present  Trachea: Trachea normal    Cardiovascular:      Rate and Rhythm: Normal rate and regular rhythm  Pulses: Normal pulses  Heart sounds: Normal heart sounds  Pulmonary:      Effort: Pulmonary effort is normal       Breath sounds: Normal breath sounds  Chest:      Chest wall: No mass, lacerations, swelling, tenderness or edema  Breasts: Jaret Score is 4  Breasts are symmetrical       Right: Normal  No swelling, bleeding, inverted nipple, mass, nipple discharge, skin change or tenderness  Left: No swelling, bleeding, inverted nipple, mass, nipple discharge, skin change or tenderness  Abdominal:      General: Abdomen is flat  Bowel sounds are normal       Palpations: Abdomen is soft  Genitourinary:     General: Normal vulva  Exam position: Lithotomy position  Pubic Area: No rash  Jaret stage (genital): 4       Labia:         Right: No rash, tenderness or lesion  Left: No rash, tenderness or lesion  Urethra: No urethral pain, urethral swelling or urethral lesion  Vagina: Normal       Cervix: No cervical motion tenderness or discharge  Uterus: Normal        Adnexa: Right adnexa normal and left adnexa normal       Rectum: Normal    Musculoskeletal:         General: Normal range of motion  Cervical back: Neck supple     Lymphadenopathy:      Cervical:      Right cervical: No superficial, deep or posterior cervical " adenopathy  Left cervical: No superficial, deep or posterior cervical adenopathy  Upper Body:      Right upper body: No supraclavicular, axillary or pectoral adenopathy  Left upper body: No supraclavicular, axillary or pectoral adenopathy  Lower Body: No right inguinal adenopathy  No left inguinal adenopathy  Skin:     General: Skin is warm and dry  Neurological:      General: No focal deficit present  Mental Status: She is alert and oriented to person, place, and time  Psychiatric:         Mood and Affect: Mood normal          Behavior: Behavior normal          Thought Content:  Thought content normal          Judgment: Judgment normal

## 2023-03-26 NOTE — PATIENT INSTRUCTIONS
Pap every 3 years if normal, STI testing as indicated, exercise most days of week, obtain appropriate diet and hydration, Calcium 1000mg + 600 vit D daily, birth control as directed (ACHES reviewed)  Benefits, risks and alternatives discussed/reviewed  Condom use when sexually active for sexually transmitted infection prevention  HPV 9 vaccine recommended through age 39  Check with your insurance for coverage  If covered, call office to schedule start of vaccine series  Annual mammogram starting at age 36, monthly breast self exam  Lavaun Rumpf   at red light or at least  20 times a day

## 2023-03-27 ENCOUNTER — ANNUAL EXAM (OUTPATIENT)
Dept: OBGYN CLINIC | Facility: CLINIC | Age: 23
End: 2023-03-27

## 2023-03-27 VITALS
HEIGHT: 69 IN | DIASTOLIC BLOOD PRESSURE: 74 MMHG | BODY MASS INDEX: 24.44 KG/M2 | SYSTOLIC BLOOD PRESSURE: 110 MMHG | WEIGHT: 165 LBS

## 2023-03-27 DIAGNOSIS — Z30.41 ORAL CONTRACEPTIVE PILL SURVEILLANCE: ICD-10-CM

## 2023-03-27 DIAGNOSIS — Z11.3 SCREEN FOR STD (SEXUALLY TRANSMITTED DISEASE): ICD-10-CM

## 2023-03-27 DIAGNOSIS — Z01.419 ENCOUNTER FOR GYNECOLOGICAL EXAMINATION WITHOUT ABNORMAL FINDING: Primary | ICD-10-CM

## 2023-03-27 DIAGNOSIS — Z12.4 SCREENING FOR CERVICAL CANCER: ICD-10-CM

## 2023-03-27 RX ORDER — LISDEXAMFETAMINE DIMESYLATE 30 MG/1
CAPSULE ORAL
COMMUNITY
Start: 2023-03-24

## 2023-03-27 RX ORDER — NORETHINDRONE ACETATE AND ETHINYL ESTRADIOL 1MG-20(24)
1 KIT ORAL DAILY
Qty: 84 TABLET | Refills: 4 | Status: SHIPPED | OUTPATIENT
Start: 2023-03-27

## 2023-03-27 RX ORDER — CETIRIZINE HYDROCHLORIDE 10 MG/1
10 TABLET ORAL DAILY
COMMUNITY
Start: 2023-02-27

## 2023-03-31 LAB
C TRACH RRNA CVX QL NAA+PROBE: NEGATIVE
CYTOLOGIST CVX/VAG CYTO: NORMAL
DX ICD CODE: NORMAL
N GONORRHOEA RRNA CVX QL NAA+PROBE: NEGATIVE
OTHER STN SPEC: NORMAL
OTHER STN SPEC: NORMAL
PATH REPORT.FINAL DX SPEC: NORMAL
SL AMB NOTE:: NORMAL
SL AMB SPECIMEN ADEQUACY: NORMAL
SL AMB TEST METHODOLOGY: NORMAL

## 2023-05-26 PROBLEM — Z12.4 SCREENING FOR CERVICAL CANCER: Status: RESOLVED | Noted: 2023-03-27 | Resolved: 2023-05-26

## 2023-05-26 PROBLEM — Z11.3 SCREEN FOR STD (SEXUALLY TRANSMITTED DISEASE): Status: RESOLVED | Noted: 2023-03-27 | Resolved: 2023-05-26

## 2023-08-11 ENCOUNTER — OFFICE VISIT (OUTPATIENT)
Dept: OBGYN CLINIC | Facility: CLINIC | Age: 23
End: 2023-08-11
Payer: COMMERCIAL

## 2023-08-11 VITALS
WEIGHT: 169.8 LBS | DIASTOLIC BLOOD PRESSURE: 72 MMHG | HEIGHT: 69 IN | BODY MASS INDEX: 25.15 KG/M2 | SYSTOLIC BLOOD PRESSURE: 118 MMHG

## 2023-08-11 DIAGNOSIS — Z11.3 SCREEN FOR STD (SEXUALLY TRANSMITTED DISEASE): ICD-10-CM

## 2023-08-11 DIAGNOSIS — N76.1 SUBACUTE VAGINITIS: Primary | ICD-10-CM

## 2023-08-11 PROCEDURE — 99213 OFFICE O/P EST LOW 20 MIN: CPT | Performed by: OBSTETRICS & GYNECOLOGY

## 2023-08-11 PROCEDURE — 87210 SMEAR WET MOUNT SALINE/INK: CPT | Performed by: OBSTETRICS & GYNECOLOGY

## 2023-08-11 RX ORDER — DEXMETHYLPHENIDATE HYDROCHLORIDE 10 MG/1
CAPSULE, EXTENDED RELEASE ORAL
COMMUNITY
Start: 2023-06-13

## 2023-08-11 NOTE — PROGRESS NOTES
PROBLEM GYNECOLOGICAL VISIT    Bárbara Kern is a 23 y.o. female who presents today with complaint of some vaginal odor . Her general medical history has been reviewed and she reports it as follows:    Past Medical History:   Diagnosis Date   • Cancer [facility]) 2021, 2022    Melanoma stage 0 right ear, left neck   • Chlamydia    • History of fractured vertebra     L1   • Melanoma ([address])     right  ear   bx     • Papanicolaou smear 2020    neg   • Thyroid goiter      Past Surgical History:   Procedure Laterality Date   • SKIN CANCER EXCISION Right    • WISDOM TOOTH EXTRACTION       OB History        0    Para   0    Term   0       0    AB   0    Living   0       SAB   0    IAB   0    Ectopic   0    Multiple   0    Live Births   0               Social History     Tobacco Use   • Smoking status: Never     Passive exposure: Never   • Smokeless tobacco: Never   Vaping Use   • Vaping Use: Never used   Substance Use Topics   • Alcohol use: Yes     Alcohol/week: 6.0 standard drinks of alcohol     Types: 6 Cans of beer per week     Comment: Weekends   • Drug use: Never       Current Outpatient Medications   Medication Instructions   • amphetamine-dextroamphetamine (ADDERALL) 15 MG tablet 15 mg, Oral, Daily   • cetirizine (ZYRTEC) 10 mg, Oral, Daily   • Multiple Vitamins-Minerals (Multivitamin Women) TABS 1 tablet, Oral, Daily   • norethindrone-ethinyl estradiol-ferrous fumarate (Blisovi 24 Fe) 1-20 MG-MCG(24) per tablet 1 tablet, Oral, Daily   • Vyvanse 30 MG capsule No dose, route, or frequency recorded. History of Present Illness:   New partner some condom use. Some odor has not used  Boric Acid  Supp. + baggy underwear to bed at night. Review of Systems:  Review of Systems   Constitutional: Negative. Gastrointestinal: Negative. Genitourinary: Positive for vaginal discharge.  Negative for difficulty urinating, dyspareunia, dysuria, frequency and menstrual problem. Psychiatric/Behavioral: Negative. Physical Exam:  There were no vitals taken for this visit. Physical Exam  Constitutional:       Appearance: Normal appearance. Genitourinary:      Vulva, bladder, rectum and urethral meatus normal.      Right Labia: No rash, tenderness, lesions or skin changes. Left Labia: No tenderness, lesions, skin changes or rash. No inguinal adenopathy present in the right or left side. Pelvic Jaret Score: 4/5. Vaginal cuff intact. No vaginal prolapse present. No vaginal atrophy present. Right Adnexa: not tender, not full and no mass present. Left Adnexa: not tender and no mass present. Cervix is not nulliparous. No cervical motion tenderness or friability. No urethral prolapse or tenderness present. Pelvic Floor: Levator muscle strength is 4/5. Pelvic exam was performed with patient in the lithotomy position. Abdominal:      Palpations: Abdomen is soft. Hernia: There is no hernia in the left inguinal area or right inguinal area. Lymphadenopathy:      Lower Body: No right inguinal adenopathy. No left inguinal adenopathy. Neurological:      General: No focal deficit present. Mental Status: She is alert and oriented to person, place, and time. Skin:     General: Skin is warm and dry. Psychiatric:         Mood and Affect: Mood normal.         Behavior: Behavior normal.         Thought Content: Thought content normal.         Judgment: Judgment normal.     I have spent a total time of 22 minutes on 08/11/23 in caring for this patient including Diagnostic results, Risks and benefits of tx options, Instructions for management, Risk factor reductions, Counseling / Coordination of care and Documenting in the medical record. Point of Care Testing:   -Wet mount: neg   -KOH mount: neg   -Whiff: neg   -urine pregnancy test: na   -urinalysis: na    Assessment:   1.  Screen for std  , vaginal odor Plan:   . Cultures ordered:GC chlam  Done  Encouraged condom use Open to air as much as possible. Dw pt vaginal hunter and may be her hunter and new partners and getting use to each other. May use Boric Acid supp as  Discussed for ph balance. Wet mount neg  rReviewed with patient that test results are available in MyChart immediately, but that they will not necessarily be reviewed by me immediately. Explained that I will review results at my earliest opportunity and contact patient appropriately.

## 2023-08-11 NOTE — PATIENT INSTRUCTIONS
Vaginitis   AMBULATORY CARE:   Vaginitis  is an inflammation or infection of your vagina. Vaginitis is commonly caused by a bacterial or fungal infection. Other causes include a foreign object or exposure to a chemical irritant. You may be given medicines to treat an infection caused by bacteria or a fungus. Medicines may be given as a pill, or as a cream, gel, or tablet you insert into your vagina. Common signs and symptoms:   Pain, itching, redness, burning, or swelling in your vagina    An odor from your vagina that may be foul or smell like fish    Thick, curd-like discharge    Thin, gray-white discharge    Small skin tears or chafing    Painful sexual intercourse    Pain when you urinate    Call your doctor if:   You have unusual vaginal bleeding. You have severe abdominal pain. You have a fever. Your symptoms get worse, even after treatment. Your symptoms return. You have questions or concerns about your condition or care. Manage your symptoms:   Take a sitz bath. Fill a bathtub with 4 to 6 inches of warm water. You may also use a sitz bath pan that fits inside a toilet bowl. Sit in the sitz bath for 15 minutes. Do this 3 times a day, and after each bowel movement. The warm water can help decrease pain and swelling. Use over-the-counter creams or ointments as directed. Examples include petroleum jelly, zinc creams, or hydrocortisone creams. These will help decrease itching and inflammation. Do not wear tight-fitting clothes or undergarments. These can make your symptoms worse. Do not use douches other irritating products in your vagina. Examples include bubble baths and perfumed soaps. The vagina is delicate and easily irritated. Ask your healthcare provider if it is okay to use tampons during your monthly periods. You may need to use pads instead until your symptoms go away. Do not have sex until your symptoms go away. When you have sex, always use a condom.  Condoms can help protect you from contact with fluids from your partner that may be causing your vaginitis. Prevent infection:   Wash your hands  with soap and water after you use the toilet. Wipe from front to back. Do this after you urinate or have a bowel movement. This will prevent germs from getting into your vagina. Wash your vagina each day. Use mild, unscented soap. Let the area air dry. Follow up with your doctor as directed: You may need to return within 3 months for more testing to make sure the infection has not returned. Write down your questions so you remember to ask them during your visits. © Copyright [employer] 2022 Information is for End User's use only and may not be sold, redistributed or otherwise used for commercial purposes. The above information is an  only. It is not intended as medical advice for individual conditions or treatments. Talk to your doctor, nurse or pharmacist before following any medical regimen to see if it is safe and effective for you.

## 2023-08-13 LAB
C TRACH RRNA SPEC QL NAA+PROBE: NEGATIVE
N GONORRHOEA RRNA SPEC QL NAA+PROBE: NEGATIVE

## 2023-10-10 PROBLEM — Z11.3 SCREEN FOR STD (SEXUALLY TRANSMITTED DISEASE): Status: RESOLVED | Noted: 2023-08-11 | Resolved: 2023-10-10

## 2023-10-23 ENCOUNTER — OFFICE VISIT (OUTPATIENT)
Dept: ENDOCRINOLOGY | Facility: HOSPITAL | Age: 23
End: 2023-10-23
Payer: COMMERCIAL

## 2023-10-23 VITALS
HEIGHT: 69 IN | WEIGHT: 175.4 LBS | SYSTOLIC BLOOD PRESSURE: 118 MMHG | HEART RATE: 64 BPM | BODY MASS INDEX: 25.98 KG/M2 | DIASTOLIC BLOOD PRESSURE: 80 MMHG

## 2023-10-23 DIAGNOSIS — Z83.49 FAMILY HISTORY OF THYROID DISEASE: ICD-10-CM

## 2023-10-23 DIAGNOSIS — M54.2 ANTERIOR NECK PAIN: ICD-10-CM

## 2023-10-23 DIAGNOSIS — R53.82 CHRONIC FATIGUE: ICD-10-CM

## 2023-10-23 DIAGNOSIS — E04.9 GOITER: Primary | ICD-10-CM

## 2023-10-23 PROCEDURE — 99204 OFFICE O/P NEW MOD 45 MIN: CPT | Performed by: INTERNAL MEDICINE

## 2023-10-23 NOTE — PROGRESS NOTES
10/23/2023    Assessment/Plan      Diagnoses and all orders for this visit:    Goiter  -     TSH, 3rd generation Lab Collect  -     T4, free Lab Collect  -     T3, free  -     Thyroid Peroxidase and Thyroglobulin Antibodies  -     Vitamin D 25 hydroxy Lab Collect  -     US thyroid; Future    Family history of thyroid disease  -     TSH, 3rd generation Lab Collect  -     T4, free Lab Collect  -     T3, free  -     Thyroid Peroxidase and Thyroglobulin Antibodies  -     Vitamin D 25 hydroxy Lab Collect  -     US thyroid; Future    Chronic fatigue  -     TSH, 3rd generation Lab Collect  -     T4, free Lab Collect  -     T3, free  -     Thyroid Peroxidase and Thyroglobulin Antibodies  -     Vitamin D 25 hydroxy Lab Collect  -     US thyroid; Future    Anterior neck pain  -     TSH, 3rd generation Lab Collect  -     T4, free Lab Collect  -     T3, free  -     Thyroid Peroxidase and Thyroglobulin Antibodies  -     Vitamin D 25 hydroxy Lab Collect  -     US thyroid; Future        1. Sensation of anterior neck pain with sensation of inability to have any clothing tight to her neck. Her physical exam is not particularly remarkable, but for completeness, I will have her do a thyroid ultrasound. 2. Family history of thyroid disease with chronic fatigue and multiple hypothyroid symptoms. She does have a TSH that has gone up over a 2-year period of time. I will repeat a TSH with free T4 and free T3 along with thyroid peroxidase antibodies and antithyroglobulin antibodies. For completeness, I will do a 25 hydroxyvitamin D level as vitamin D deficiency can cause fatigue and arthralgias also. Follow-up will be determined based on the blood work and ultrasound results. CC: Thyroid consult. HPI: Nagi Waterman is a 78-year-old female, presents for new patient evaluation after 4 years due to concerns regarding her routine laboratory results, which was obtained in 03/2023.  Her TSH levels have approximately doubled since the previous test. There was an increase in her TSH levels from 2.940 last year to 4.380 this year. Her family doctor stated that it was still in range but it changed. 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 She experienced night sweats and hot flashes with hypersensitivity to anything touches her neck as it used to be in the past. With lot of stress and anxiety, she has difficulty getting a good night sleep with sleep talking, sleep walking and irregular sleep pattern. 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 She states that these sleep problems seem to be a hereditary traits within their family. Her anxiety increased and having ADHD, she is on stimulant whether Focalin or amphetamines. Her hearing and vision are both in good condition and without diplopia. She denies dysphagia, orthopnea, depression, tremors, hair loss, headaches, lightheaded, dizziness, numbness or tingling  sensation. She is currently on birth control which is ethinyl estradiol and norethindrone and her menstrual cycle occurs regularly every 3 months as a result of her birth control method. The purpose of her birth control pills is both for regulating her irregular menstrual cycle and also to prevent unwanted pregnancies. She noticed random palpitation and the latest episode was last night. She had  right bundle branch block and on Adderall. She perceives a higher degree of dyspnea compared to others and feels random joint stiffness in the morning.  She suffered from diarrhea last night and some bowel issues in the past. She underwent a colonoscopy on 2022 due to hematochezia and was determined that she had internal hemorrhoid and did not mentioned any irritable bowel syndrome so the patient still consumes milk and dairy products. She experienced abdominal pain and strong sense of urgency for either constipation or diarrhea with shooting pain down her lower extremities when going to the bathroom. She had a history of melanoma in situ 3 times along with eczema and rashes in the winter, particularly on her right lateral leg and knee resulting from sun exposure even using sunscreen. Otherwise, her skin is not excessively dry. As a result, she had frequent visits to her dermatologist. She admits onychoschizia and was not taking any vitamin D at this point. She observed some mild swelling in her legs and it tends to increase when she is on her feet for extended periods and wanted to use compression socks. Her ultrasound last 2019 was normal without nodules. Her aunt and the family of her mother side has hypothyroidism and Hashimoto's. Her mother also had thyroid cancer. Review of Systems  The pertinent positive and negative findings are as noted in the HPI. Historical Information   Past Medical History:   Diagnosis Date    ADHD     Bacterial vaginosis     Patient states, "I feel like I'm prone to getting BV."    Cancer [facility]) August 2021, january 2022    Melanoma stage 0 right ear, left neck    Chlamydia 2016    Eczema     History of fractured vertebra 2014    L1    Melanoma ([address])     right  ear   bx  2021, stage 0 X 3 ( left neck also and left lupper abdomen.     Papanicolaou smear 11/25/2020    neg    Thyroid goiter      Past Surgical History:   Procedure Laterality Date    SKIN CANCER EXCISION Right     3 melanoma surgeries    WISDOM TOOTH EXTRACTION       Social History   Social History     Substance and Sexual Activity   Alcohol Use Yes    Alcohol/week: 8.0 standard drinks of alcohol    Types: 6 Cans of beer, 2 Shots of liquor per week    Comment: Weekends     Social History     Substance and Sexual Activity   Drug Use Never     Social History     Tobacco Use Smoking Status Never    Passive exposure: Never   Smokeless Tobacco Never     Family History:   Family History   Problem Relation Age of Onset    Thyroid cancer Mother     Cancer Mother         Thyroid cancer    Other Mother         latent TB treatment    Hyperlipidemia Father     Anxiety disorder Father     No Known Problems Brother     No Known Problems Brother     Hashimoto's thyroiditis Maternal Aunt     Hypothyroidism Maternal Aunt     Thyroid disease unspecified Paternal Aunt         high TSH    Breast cancer Maternal Grandmother     Hypertension Maternal Grandmother     Hypothyroidism Maternal Grandmother     Hyperlipidemia Maternal Grandmother     Diabetes type II Maternal Grandfather     Hypertension Maternal Grandfather     Hyperlipidemia Maternal Grandfather     Hyperlipidemia Paternal Grandmother     Colon cancer Neg Hx     Ovarian cancer Neg Hx        Meds/Allergies   Current Outpatient Medications   Medication Sig Dispense Refill    dexmethylphenidate (FOCALIN XR) 10 MG 24 hr capsule TAKE TAKE 1 CAPSULE BY MOUTH DAILY      norethindrone-ethinyl estradiol-ferrous fumarate (Blisovi 24 Fe) 1-20 MG-MCG(24) per tablet Take 1 tablet by mouth daily 84 tablet 4    amphetamine-dextroamphetamine (ADDERALL) 15 MG tablet Take 15 mg by mouth daily (Patient not taking: Reported on 10/23/2023)      ASHWAGANDHA PO Take by mouth (Patient not taking: Reported on 10/23/2023)      Multiple Vitamins-Minerals (Multivitamin Women) TABS Take 1 tablet by mouth daily (Patient not taking: Reported on 8/11/2023)       No current facility-administered medications for this visit. Allergies   Allergen Reactions    Iodinated Contrast Media Hives, Nausea Only and Vomiting     Other reaction(s): Vomiting, Vomiting       Objective   Vitals: Blood pressure 118/80, pulse 64, height 5' 8.5" (1.74 m), weight 79.6 kg (175 lb 6.4 oz).   Invasive Devices       None                   Physical Exam  Physical exam normal except for pertinent positives and negatives. Eyes: No lid lag, stare, proptosis, or periorbital edema. Thyroid: Thyroid not particularly enlarged but full. No thyroid nodules. Thyroid tender to palpation particularly over the isthmus but also over both lobes. Cardiovascular: Heart regular without murmurs and no tachycardia. Neurological: No tremor of the outstretched hands. Patellar deep tendon reflexes normal.  Respiratory: Lungs clear. The history was obtained from the review of the chart and from the patient. Lab Results:        Blood work performed on 03/21/2023 showed a CBC that was normal except for absolute lymphs of 3.3, which were slightly high. CMP showed a glucose of 84 and alkaline phosphatase of 40, but was otherwise normal. Hemoglobin A1c is 5%. TSH is 4.38 and previous TSH on 01/21/2022 was 2.94. Of note, current cholesterol is 173 with triglycerides 96, HDL 54, and . No future appointments. Transcribed for Danii Hyatt MD, by Kulwant Bullock on 10/23/23 at 8:34 PM. Powered by [employer].

## 2023-10-24 LAB
25(OH)D3+25(OH)D2 SERPL-MCNC: 40.7 NG/ML (ref 30–100)
T3FREE SERPL-MCNC: 3 PG/ML (ref 2–4.4)
T4 FREE SERPL-MCNC: 1.03 NG/DL (ref 0.82–1.77)
THYROGLOB AB SERPL-ACNC: <1 IU/ML (ref 0–0.9)
THYROPEROXIDASE AB SERPL-ACNC: 22 IU/ML (ref 0–34)
TSH SERPL DL<=0.005 MIU/L-ACNC: 2.01 UIU/ML (ref 0.45–4.5)

## 2023-11-16 ENCOUNTER — TELEPHONE (OUTPATIENT)
Dept: OBGYN CLINIC | Facility: CLINIC | Age: 23
End: 2023-11-16

## 2023-11-16 NOTE — TELEPHONE ENCOUNTER
Pt left a message she would like to change her pharmacy, needs her prescription by Sunday. Left a message for patient informing [facility], she may call preferred [facility] to have them transfer prescription. If changing to other than a [facility], callback with updated pharmacy information.

## 2024-03-31 DIAGNOSIS — Z30.41 ORAL CONTRACEPTIVE PILL SURVEILLANCE: ICD-10-CM

## 2024-04-01 RX ORDER — NORETHINDRONE ACETATE AND ETHINYL ESTRADIOL 1MG-20(24)
1 KIT ORAL DAILY
Qty: 84 TABLET | Refills: 1 | Status: SHIPPED | OUTPATIENT
Start: 2024-04-01

## 2024-07-26 ENCOUNTER — ANNUAL EXAM (OUTPATIENT)
Dept: OBGYN CLINIC | Facility: CLINIC | Age: 24
End: 2024-07-26
Payer: COMMERCIAL

## 2024-07-26 VITALS
HEIGHT: 69 IN | DIASTOLIC BLOOD PRESSURE: 68 MMHG | WEIGHT: 174 LBS | SYSTOLIC BLOOD PRESSURE: 104 MMHG | BODY MASS INDEX: 25.77 KG/M2

## 2024-07-26 DIAGNOSIS — Z01.419 ENCOUNTER FOR GYNECOLOGICAL EXAMINATION WITHOUT ABNORMAL FINDING: Primary | ICD-10-CM

## 2024-07-26 DIAGNOSIS — N92.6 IRREGULAR MENSES: ICD-10-CM

## 2024-07-26 DIAGNOSIS — Z30.09 CONTRACEPTIVE EDUCATION: ICD-10-CM

## 2024-07-26 DIAGNOSIS — Z83.49 FAMILY HISTORY OF THYROID DISEASE: ICD-10-CM

## 2024-07-26 PROCEDURE — S0612 ANNUAL GYNECOLOGICAL EXAMINA: HCPCS | Performed by: OBSTETRICS & GYNECOLOGY

## 2024-07-26 NOTE — PROGRESS NOTES
[facility] [address], [address], [place] [address]    ASSESSMENT/PLAN: Vibha Dacosta is a 23 y.o.  who presents for annual gynecologic exam.    Encounter for routine gynecologic examination  - Routine well woman exam completed today.  - HPV Vaccination status: Immunization series complete  - STI screening offered including HIV testing: Declined  - Contraceptive counseling discussed.  Current contraception: condoms:     Additional problems addressed during this visit:  1. Encounter for gynecological examination without abnormal finding  2. Contraceptive education  3. Irregular menses  4. Family history of thyroid disease  Stop pill condoms.  Report cycles less than 21 d from d 1 to d 1, lasting greater than 10 d or no menses in  3 mo.  Even spotting counts.     CC:  Annual Gynecologic Examination    HPI: Vbiha Dacosta is a 23 y.o.  who presents for annual gynecologic examination.  22 yo  Ot  here for wellness exam.  Would like to go off ocp    to see how he body does.  Started OCP as a teen for irregular menses and acne.  Same partner  using O ring for fertility..  some urgency of urination due  to  not going often.         The following portions of the patient's history were reviewed and updated as appropriate: She  has a past medical history of ADHD, Bacterial vaginosis, Cancer (HCC) (2021, 2022), Chlamydia (), Eczema, History of fractured vertebra (), Melanoma ([facility]), Papanicolaou smear (2023), and Thyroid goiter.  She  has a past surgical history that includes [place] tooth extraction and Skin cancer excision (Right).  Her family history includes Anxiety disorder in her father; Breast cancer in her maternal grandmother; Cancer in her mother; Diabetes type II in her maternal grandfather; Hashimoto's thyroiditis in her maternal aunt; Hyperlipidemia in her father, maternal grandfather, maternal grandmother, and paternal grandmother;  "Hypertension in her maternal grandfather and maternal grandmother; Hypothyroidism in her maternal aunt and maternal grandmother; No Known Problems in her brother and brother; Other in her mother; Thyroid cancer in her mother; Thyroid disease unspecified in her paternal aunt.  She  reports that she has never smoked. She has never been exposed to tobacco smoke. She has never used smokeless tobacco. She reports current alcohol use of about 6.0 standard drinks of alcohol per week. She reports that she does not use drugs.  Current Outpatient Medications   Medication Sig Dispense Refill   • amphetamine-dextroamphetamine (ADDERALL) 15 MG tablet Take 15 mg by mouth daily     • Multiple Vitamins-Minerals (Multivitamin Women) TABS Take 1 tablet by mouth daily       No current facility-administered medications for this visit.     She is allergic to iodinated contrast media..    Review of Systems   Constitutional:  Negative for chills and fever.   HENT:  Negative for ear pain and sore throat.    Eyes: Negative.  Negative for pain and visual disturbance.   Respiratory:  Negative for cough and shortness of breath.    Cardiovascular:  Negative for chest pain and palpitations.   Gastrointestinal:  Negative for abdominal pain and vomiting.   Genitourinary:  Negative for dysuria and hematuria.   Musculoskeletal:  Negative for arthralgias and back pain.   Skin:  Negative for color change and rash.   Allergic/Immunologic: Negative.    Neurological: Negative.  Negative for seizures and syncope.   Hematological: Negative.    Psychiatric/Behavioral: Negative.     All other systems reviewed and are negative.        Objective:  /68 (BP Location: Left arm, Patient Position: Sitting, Cuff Size: Standard)   Ht 5' 9\" (1.753 m)   Wt 78.9 kg (174 lb)   LMP  (LMP Unknown) Comment: irregular- spotting  Breastfeeding No   BMI 25.70 kg/m²    Physical Exam  Vitals and nursing note reviewed.   Constitutional:       Appearance: Normal " appearance.   HENT:      Head: Normocephalic.   Cardiovascular:      Rate and Rhythm: Normal rate and regular rhythm.      Pulses: Normal pulses.      Heart sounds: Normal heart sounds.   Pulmonary:      Effort: Pulmonary effort is normal.      Breath sounds: Normal breath sounds.   Chest:      Chest wall: No mass, lacerations, swelling, tenderness or edema.   Breasts:     Jaret Score is 4.      Breasts are symmetrical.      Right: Normal. No swelling, bleeding, inverted nipple, mass, nipple discharge, skin change or tenderness.      Left: No swelling, bleeding, inverted nipple, mass, nipple discharge, skin change or tenderness.   Abdominal:      General: Abdomen is flat. Bowel sounds are normal.      Palpations: Abdomen is soft.   Genitourinary:     General: Normal vulva.      Exam position: Lithotomy position.      Pubic Area: No rash.       Jaret stage (genital): 4.      Labia:         Right: No rash, tenderness or lesion.         Left: No rash, tenderness or lesion.       Urethra: No urethral pain, urethral swelling or urethral lesion.      Vagina: Normal.      Cervix: No cervical motion tenderness or discharge.      Uterus: Normal.       Adnexa: Right adnexa normal and left adnexa normal.      Rectum: Normal.   Musculoskeletal:         General: Normal range of motion.      Cervical back: Normal range of motion and neck supple.   Lymphadenopathy:      Upper Body:      Right upper body: No supraclavicular, axillary or pectoral adenopathy.      Left upper body: No supraclavicular, axillary or pectoral adenopathy.      Lower Body: No right inguinal adenopathy. No left inguinal adenopathy.   Skin:     General: Skin is warm and dry.   Neurological:      General: No focal deficit present.      Mental Status: She is alert and oriented to person, place, and time.   Psychiatric:         Mood and Affect: Mood normal.         Behavior: Behavior normal.         Thought Content: Thought content normal.         Judgment:  Judgment normal.

## 2025-06-24 ENCOUNTER — OFFICE VISIT (OUTPATIENT)
Dept: OBGYN CLINIC | Facility: CLINIC | Age: 25
End: 2025-06-24
Payer: COMMERCIAL

## 2025-06-24 VITALS
DIASTOLIC BLOOD PRESSURE: 64 MMHG | SYSTOLIC BLOOD PRESSURE: 106 MMHG | BODY MASS INDEX: 24.88 KG/M2 | WEIGHT: 168 LBS | HEIGHT: 69 IN

## 2025-06-24 DIAGNOSIS — Z83.49 FAMILY HISTORY OF THYROID DISEASE: ICD-10-CM

## 2025-06-24 DIAGNOSIS — N92.6 IRREGULAR MENSES: Primary | ICD-10-CM

## 2025-06-24 DIAGNOSIS — E04.9 GOITER: ICD-10-CM

## 2025-06-24 DIAGNOSIS — E04.1 THYROID NODULE: ICD-10-CM

## 2025-06-24 PROCEDURE — 99213 OFFICE O/P EST LOW 20 MIN: CPT | Performed by: OBSTETRICS & GYNECOLOGY

## 2025-06-24 NOTE — PROGRESS NOTES
"PROBLEM GYNECOLOGICAL VISIT    Vibha Dacosta is a 24 y.o. female who presents today with complaint of  irregular   menses .   Her general medical history has been reviewed and she reports it as follows:    Past Medical History[1]  Past Surgical History[2]  OB History        0    Para   0    Term   0       0    AB   0    Living   0       SAB   0    IAB   0    Ectopic   0    Multiple   0    Live Births   0           Obstetric Comments   Menarche   10  could go   more than   3 mo ow o menes             Social History[3]    Current Outpatient Medications   Medication Instructions   • amphetamine-dextroamphetamine (ADDERALL) 15 MG tablet 15 mg, Daily   • Multiple Vitamins-Minerals (Multivitamin Women) TABS 1 tablet, Oral, Daily       History of Present Illness:   Pt w a hx of  irregular  cycles   went on  OCP for cycle  regulation .    Now  cycles  since stopping ocp in 2024  have  been  35-40  last  one was  57  days a part .  For  4 days usually  .   Last one   bleeding  brown to red  w a few clots for 7 days   Wears  tampons  and changes every  4 hours  cramps   prior .   + doing ovulation  predictors.  Avoiding  intercourse during that time.      Hx of broken back and  shadow  on  pituitary gland.   + followed  by endocrine for her   thyroid. + wt loss. + exercise and  better eating.   Feels like thngs need to be addressed     Review of Systems:  Review of Systems   Constitutional: Negative.    Cardiovascular: Negative.    Gastrointestinal: Negative.    Genitourinary:  Positive for menstrual problem.   Neurological: Negative.    Hematological: Negative.    Psychiatric/Behavioral: Negative.         Physical Exam:  /64 (BP Location: Left arm, Patient Position: Sitting, Cuff Size: Standard)   Ht 5' 9\" (1.753 m)   Wt 76.2 kg (168 lb)   LMP 2025 (Exact Date)   BMI 24.81 kg/m²   Physical Exam  Constitutional:       Appearance: Normal appearance.     Neurological:      General: No focal " "deficit present.      Mental Status: She is alert and oriented to person, place, and time.     Psychiatric:         Mood and Affect: Mood normal.         Behavior: Behavior normal.         Thought Content: Thought content normal.         Judgment: Judgment normal.   Vitals and nursing note reviewed.         Assessment:   1.  Pms ,  irregular  cycles   goiter     Plan:   Pt asking for hormone levels to be  checked.  Dw pt   Pituitary ovarian axis and   if androgens are elevated can space  menses out. + use of ocp w really no bleeding.  DW pt   endometrial protection  on OCP.    Will get day 3 labs  and  tv us day 5-10 of  cycle.   Continue  healthy diet and exercise.  Dw pt  future fertility and it may take her a little longer to get pregnant possibly.  If > 3 mo wo a  menses call the office  and will do  preg test and  prog WD bleed.   I have spent a total time of 25 minutes in caring for this patient on the day of the visit/encounter including Risks and benefits of tx options, Instructions for management, Patient and family education, Importance of tx compliance, Risk factor reductions, Counseling / Coordination of care, Documenting in the medical record, Reviewing/placing orders in the medical record (including tests, medications, and/or procedures), and Obtaining or reviewing history  .         Reviewed with patient that test results are available in [facility] immediately, but that they will not necessarily be reviewed by me immediately.  Explained that I will review results at my earliest opportunity and contact patient appropriately.         [1]  Past Medical History:  Diagnosis Date   • ADHD    • Bacterial vaginosis     Patient states, \"I feel like I'm prone to getting BV.\"   • Cancer ([facility]) August 2021, january 2022    Melanoma stage 0 right ear, left neck   • Chlamydia 2016   • Eczema    • History of fractured vertebra 2014    L1   • Melanoma (HCC)     right  ear   bx  2021, stage 0 X 3 ( left neck also and " left lupper abdomen.   • Papanicolaou smear 04/03/2023    neg   • Thyroid goiter    [2]  Past Surgical History:  Procedure Laterality Date   • SKIN CANCER EXCISION Right     3 melanoma surgeries   • WISDOM TOOTH EXTRACTION     [3]  Social History  Tobacco Use   • Smoking status: Never     Passive exposure: Never   • Smokeless tobacco: Never   Vaping Use   • Vaping status: Never Used   Substance Use Topics   • Alcohol use: Yes     Alcohol/week: 6.0 standard drinks of alcohol     Types: 6 Cans of beer per week     Comment: Weekends   • Drug use: Never

## 2025-07-28 ENCOUNTER — ANNUAL EXAM (OUTPATIENT)
Dept: OBGYN CLINIC | Facility: CLINIC | Age: 25
End: 2025-07-28
Payer: COMMERCIAL

## 2025-07-28 VITALS
HEIGHT: 69 IN | WEIGHT: 170 LBS | BODY MASS INDEX: 25.18 KG/M2 | SYSTOLIC BLOOD PRESSURE: 104 MMHG | DIASTOLIC BLOOD PRESSURE: 68 MMHG

## 2025-07-28 DIAGNOSIS — Z01.419 ENCOUNTER FOR GYNECOLOGICAL EXAMINATION WITHOUT ABNORMAL FINDING: Primary | ICD-10-CM

## 2025-07-28 DIAGNOSIS — Z83.49 FAMILY HISTORY OF THYROID DISEASE: ICD-10-CM

## 2025-07-28 DIAGNOSIS — N92.6 IRREGULAR MENSES: ICD-10-CM

## 2025-07-28 PROCEDURE — S0612 ANNUAL GYNECOLOGICAL EXAMINA: HCPCS | Performed by: OBSTETRICS & GYNECOLOGY

## 2025-08-11 ENCOUNTER — HOSPITAL ENCOUNTER (OUTPATIENT)
Age: 25
Discharge: HOME | End: 2025-08-11
Attending: OBSTETRICS & GYNECOLOGY
Payer: COMMERCIAL

## 2025-08-13 ENCOUNTER — RESULTS FOLLOW-UP (OUTPATIENT)
Dept: OBGYN CLINIC | Facility: CLINIC | Age: 25
End: 2025-08-13

## 2025-08-13 LAB
17OHP SERPL-MCNC: 36 NG/DL
DHEA-S SERPL-MCNC: 338 UG/DL (ref 84.8–378)
EST. AVERAGE GLUCOSE BLD GHB EST-MCNC: 94 MG/DL
ESTRADIOL SERPL HS-MCNC: 30 PG/ML
FSH SERPL-ACNC: 7.1 MIU/ML
HBA1C MFR BLD: 4.9 % (ref 4.8–5.6)
LH SERPL-ACNC: 7.5 MIU/ML
PROLACTIN SERPL-MCNC: 12.9 NG/ML (ref 4.8–33.4)
TESTOST FREE SERPL-MCNC: 2.5 PG/ML (ref 0–4.2)
TESTOST SERPL-MCNC: 37 NG/DL (ref 13–71)

## 2025-08-27 PROBLEM — Z01.419 ENCOUNTER FOR GYNECOLOGICAL EXAMINATION WITHOUT ABNORMAL FINDING: Status: RESOLVED | Noted: 2025-07-28 | Resolved: 2025-08-27
